# Patient Record
Sex: MALE | URBAN - METROPOLITAN AREA
[De-identification: names, ages, dates, MRNs, and addresses within clinical notes are randomized per-mention and may not be internally consistent; named-entity substitution may affect disease eponyms.]

---

## 2020-02-05 ENCOUNTER — APPOINTMENT (OUTPATIENT)
Dept: URBAN - METROPOLITAN AREA SURGERY 9 | Age: 70
Setting detail: DERMATOLOGY
End: 2020-02-07

## 2020-02-05 VITALS — HEART RATE: 76 BPM | SYSTOLIC BLOOD PRESSURE: 164 MMHG | RESPIRATION RATE: 14 BRPM | DIASTOLIC BLOOD PRESSURE: 100 MMHG

## 2020-02-05 PROBLEM — C44.311 BASAL CELL CARCINOMA OF SKIN OF NOSE: Status: ACTIVE | Noted: 2020-02-05

## 2020-02-05 PROCEDURE — OTHER CONSULTATION FOR MOHS SURGERY: OTHER

## 2020-02-05 PROCEDURE — OTHER MOHS SURGERY: OTHER

## 2020-02-05 PROCEDURE — OTHER RETURN TO REFERRING PROVIDER: OTHER

## 2020-02-05 PROCEDURE — 17311 MOHS 1 STAGE H/N/HF/G: CPT

## 2020-02-05 PROCEDURE — 17312 MOHS ADDL STAGE: CPT

## 2020-02-05 NOTE — HPI: MOHS SURGERY CONSULTATION
Has The Cancer Been Biopsied Before?: has been previously biopsied
Who Is Your Referring Provider?: Dr. Meng
When Was Your Biopsy?: 01/10/2020

## 2020-02-13 NOTE — PROCEDURE: MOHS SURGERY
96 Ventricular Rate BPM  96 Atrial Rate BPM  148 P-R Interval ms  78 QRS Duration ms  352 Q-T Interval ms  444 QTC Calculation(Bazett) ms  45 P Axis degrees  -12 R Axis degrees  51 T Axis degrees  Normal sinus rhythm  Moderate voltage criteria for LVH, may be normal variant  Borderline ECG  Confirmed by Robert PIERRE, Sunil ([id number]) on 9/5/2019 6:06:25 PM   IV intact Transposition Flap Text: The defect edges were debeveled with a #15 scalpel blade.  Given the location of the defect and the proximity to free margins a transposition flap was deemed most appropriate.  Using a sterile surgical marker, an appropriate transposition flap was drawn incorporating the defect.    The area thus outlined was incised deep to adipose tissue with a #15 scalpel blade.  The skin margins were undermined to an appropriate distance in all directions utilizing iris scissors.

## 2022-03-09 NOTE — PROCEDURE: MOHS SURGERY
Spoke with pt's wife. Advised pt can use an ACE wrap for now.  Pt will see ortho tomorrow--they may have other recommedations Mauc Instructions: By selecting yes to the question below the MAUC number will be added into the note.  This will be calculated automatically based on the diagnosis chosen, the size entered, the body zone selected (H,M,L) and the specific indications you chose. You will also have the option to override the Mohs AUC if you disagree with the automatically calculated number and this option is found in the Case Summary tab.

## 2023-02-03 ENCOUNTER — APPOINTMENT (OUTPATIENT)
Dept: URBAN - METROPOLITAN AREA SURGERY 9 | Age: 73
Setting detail: DERMATOLOGY
End: 2023-02-06

## 2023-02-03 PROBLEM — C44.319 BASAL CELL CARCINOMA OF SKIN OF OTHER PARTS OF FACE: Status: ACTIVE | Noted: 2023-02-03

## 2023-02-03 PROCEDURE — OTHER MOHS SURGERY: OTHER

## 2023-02-03 PROCEDURE — 17311 MOHS 1 STAGE H/N/HF/G: CPT

## 2023-02-03 PROCEDURE — 17312 MOHS ADDL STAGE: CPT

## 2023-02-03 PROCEDURE — OTHER CONSULTATION FOR MOHS SURGERY: OTHER

## 2023-02-03 PROCEDURE — OTHER RETURN TO REFERRING PROVIDER: OTHER

## 2023-02-03 PROCEDURE — OTHER MIPS QUALITY: OTHER

## 2023-02-03 PROCEDURE — 12053 INTMD RPR FACE/MM 5.1-7.5 CM: CPT

## 2023-02-03 NOTE — PROCEDURE: CONSULTATION FOR MOHS SURGERY
Detail Level: Detailed
Body Location Override (Optional - Billing Will Still Be Based On Selected Body Map Location If Applicable): right angle of jaw
X Size Of Lesion In Cm (Optional): 0
Name Of The Referring Provider For Procedure: Dr. Meng
Incorporate Mauc In Note: Yes

## 2023-02-03 NOTE — PROCEDURE: MOHS SURGERY
Kidney Stone (with Pain)    The sharp cramping pain on either side of your lower back and nausea/vomiting that you have are because of a small stone that has formed in the kidney. It is now passing down a narrow tube (ureter) on its way to your bladder. Once the stone reaches your bladder, the pain will often stop. But it may come back as the stone continues to pass out of the bladder and through the urethra. The stone may pass in your urine stream in one piece. The size may be 1/16 inch to 1/4 inch (1 to 6 mm). Or, the stone may break up into demetrio fragments that you may not even notice.  Once you have had a kidney stone, you are at risk of getting another one in the future. There are 4 types of kidney stones. Eighty percent are calcium stones--mostly calcium oxalate but also some with calcium phosphate. The other 3 types include uric acid stones, struvite stones (from a preceding infection), and rarely, cystine stones.  Most stones will pass on their own, but may take from a few hours to a few days. Sometimes the stone is too large to pass by itself. In that case, the health care provider will need to use other ways to remove the stone. These techniques include:  · Lithotripsy. This uses ultrasound waves to break up the stone.  · Ureteroscopy. This pushes a basket-like instrument through the urethra and bladder and into the ureter to pull out the stone.  · Various types of direct surgery through the skin  Home care  The following are general care guidelines:  · Drink plenty of fluids. This means at least 12, 8-ounce glasses of fluid--mostly water--a day.  · Each time you urinate, do so in a jar. Pour the urine from the jar through the strainer and into the toilet. Continue doing this until 24 hours after your pain stops. By then, if there was a kidney stone, it should pass from your bladder. Some stones dissolve into sand-like particles and pass right through the strainer. In that case, you won’t ever see a  stone.  · Save any stone that you find in the strainer and bring it to your doctor to look at. It may be possible to stop certain types of stones from forming. For this reason, it is important to know what kind of stone you have.  · Try to stay as active as possible. This will help the stone pass. Don't stay in bed unless your pain keeps you from getting up. You may notice a red, pink, or brown color to your urine. This is normal while passing a kidney stone.  · If you develop pain, you may take ibuprofen or naproxen for pain, unless another medicine was prescribed. If you have chronic liver or kidney disease, talk with your health care provider before taking these medicines. Also talk with your provider if you've had a stomach ulcer or GI bleeding.  Preventing stones  Each year for the next 5 to 7 years, you are at risk that a new stone will form. Your risk is a 50% chance over this time period. The risk is higher if you have a family history of kidney stones or have certain chronic illnesses like hypertension, obesity, or diabetes. But you can make changes to your lifestyle and diet that can lower your risk for another stone.  Most kidney stones are made of calcium. The following is advice for preventing another calcium stone. If you don’t know the type of stone you have, follow this advice until the cause of your stone is found.  Things that help:  · The most important thing you can do is to drink plenty of fluids each day. See home care above.   · Eat foods that contain phytates. These include wheat, rice, rye, barley, and beans. Phytates are substances that may lower your risk for any type of stone for form.  · Eat more fruits and vegetables. Choose those that are high in potassium.  · Eat foods high in natural citrate like fruit and low-sugar fruit juices.  · Having too little calcium in your diet can put you at risk for calcium kidney stones. Eat a normal amount of calcium in your diet and talk with your  health care provider if you are taking calcium supplements. Cutting back on your calcium intake may raise your risk. New research shows that eating calcium-rich and oxalate-rich foods together lowers your risk for stones by binding the minerals in the stomach and intestines before they can reach the kidneys.    · Limit salt intake to 2 grams (1 teaspoon) per day. Use limited amounts when cooking, and don’t add salt at the table. Processed and canned foods are usually high in salt.   · Spinach, rhubarb, peanuts, cashews, almonds, grapefruit, and grapefruit juice are all high oxalate foods. You should limit how much of these you eat. Or eat them with calcium-rich foods. These include dairy products, dark leafy greens, soy products, and calcium-enriched foods.  · Reducing the amount of animal meat and high protein foods in your diet may lower your risk of uric acid stones.  · Avoid excess sugar (sucrose) and fructose (sweetener in many soft drinks) in your diet.   · If you take vitamin C as a supplement, don't take more than 1,000 mg a day.  · A dietitian or your health provider can give you information about changes in your diet that will help stop more kidney stone from forming.  Follow-up care  Follow up with your health care provider, or as advised, if the pain lasts more than 48 hours. Talk with your provider about urine and blood tests to find out the cause of your stone. If you had an X-ray, CT scan, or other diagnostic test, it will be looked at by a specialist. You will be told of any new findings that may affect your care.  When to seek medical advice  Call your health care provider right away if any of these occur:  · Pain that is not controlled by the medicine given  · Repeated vomiting or unable to keep down fluids  · Weakness, dizziness, or fainting  · Fever of 100.4ºF (38ºC) or higher, or as directed by your health care provider  · Passage of solid red or brown urine (can't see through it) or urine with  lots of blood clots  · Foul-smelling or cloudy urine  · Unable to pass urine for 8 hours and increasing bladder pressure  © 2000-2015 [employer]. [address] [place] [address], [place] [address]. All rights reserved. This information is not intended as a substitute for professional medical care. Always follow your healthcare professional's instructions.        MEDICATION: NAPROXEN  Naproxen (brand names: Naprosyn, Anaprox, Aleve, Naprelan, Pamprin, Midol) is an anti-inflammatory drug which is very useful for pain and inflammatory conditions.  DIRECTIONS FOR USE:  You may take this medicine with food to reduce stomach upset.  WHAT TO WATCH FOR:  POSSIBLE SIDE EFFECTS: Nausea, upper abdominal pain, drowsiness, dizziness, ringing in the ears (Contact your doctor if these symptoms persist or become severe). Bleeding from the stomach, which may appear as blood in vomit or stool (red or black color); rapid weight gain, leg swelling or easy bruising, change in the amount of urine, confusion/mood changes, very stiff neck, yellowing of the eyes/skin (Contact your doctor or return to this facility promptly).  ALLERGIC REACTION: Rash, itching, swelling, trouble breathing or swallowing (Contact your doctor or return to this facility promptly).  MEDICAL CONDITIONS: Before starting this medicine, be sure your doctor knows if you have any of the following conditions:  · Stomach ulcer (active or in the past), history of vomiting blood or bloody stools  · Allergic reaction to aspirin or other anti-inflammatory medicines  · Asthma, nasal polyps or angioedema; pregnancy or breastfeeding  · Liver or kidney disease; bleeding disorder, diabetes  DRUG INTERACTION: Before starting this medicine, be sure your doctor knows if you are taking any of the following drugs:  · Blood thinners [Coumadin (warfarin), low molecular weight heparins, heparin], cyclosporine, diuretics (water pills), blood pressure pills, ACE inhibitors (Lotensin,  Capoten, Vasotec, Zestril, and others), diabetes pills, steroids (methylprednisolone, prednisone), aspirin or other anti-inflammatory drugs, methotrexate, probenecid, cidofovir, antiplatelet drugs such as Pletal (cilostazol) or Plavix (clopidogrel), desmopressin, drugs for osteoporosis [Fosamax(alendronate)], lithium, pemetrexed, bile acid binding resins, drotrecogin, SSRI antidepressants (fluoxetine, sertaline)  WARNINGS:  · Do not take with prednisone, other anti-inflammatory drugs, or alcohol since this increases the risk of getting a bleeding ulcer.  · Do not drive, ride a bicycle, or operate dangerous equipment while taking this medicine until you know how it will affect you.  [NOTE: This information topic may not include all directions, precautions, medical conditions, drug/food interactions, and warnings for this drug. Check with your doctor, nurse, or pharmacist for any questions that you may have.]  © 2000-2012 [facility], [address] [place] [address]. All rights reserved. This information is not intended as a substitute for professional medical care. Always follow your healthcare professional's instructions.  MEDICATION:  OXYCODONE AND ACETAMINOPHEN  Oxycodone and acetaminophen (brand names: Tylox, Endocet, Roxicet, Endocet, Percocet, and others) is a narcotic pain medicine. Be sure to take it only as directed.  DIRECTIONS FOR USE:  If this medicine upsets your stomach, take it with food. Pain medicine should be taken only if needed at the times prescribed. If you are not having pain, do not take the medicine unless you are advised to do so by your doctor. Limit your use of acetaminophen-containing products while taking this medicine. Too much acetaminophen may cause liver damage.  WHAT TO WATCH FOR  POSSIBLE SIDE EFFECTS: Dizziness, drowsiness (Rise slowly from a seated or lying position). Constipation (Drink lots of liquids, eat a high-fiber diet, use small doses of a mild laxative like  milk of magnesia as needed). Nausea, vomiting (Take the medicine with food, eat small meals, lie down. If persistent or severe, contact your doctor). Yellowing of eyes/skin, dark urine, unusual stomach pain (Contact your doctor). Difficulty passing urine, fainting, trouble breathing, difficulty waking up, chest pain, fast/irregular heartbeats, seizures (Stop the medicine and contact your doctor or return to this facility promptly).  ALLERGIC REACTION: Rash, itching, swelling, trouble breathing or swallowing (Contact your doctor or return to this facility promptly).  MEDICAL CONDITIONS: Before starting this medicine, be sure your doctor knows if you have any of the following conditions:  · Prostate enlargement, liver disease  · Breathing problems, kidney disease  · Stomach/intestinal problems  · History of head injury or seizures  · Alcohol or drug dependency  · Pregnancy or breastfeeding  DRUG INTERACTIONS: Before starting this medicine, be sure your doctor knows if you are taking any of the following drugs: narcotic blockers (naltrexone), alvimopan, conivaptan, dasatinib/imatinib, macrolide antibiotics, isonazid, itraconazole/ketoconazole, SSRI antidepressants (fluoxetine, sertaline, and others), protease inhibitors, serotonin agonists, sibutramine, other products containing acetaminophen, bile acid binding resins, voriconazole  · Barbiturates, Tegretol (carbamazepine), Dilantin (phenytoin), rifampin  · Coumadin (warfarin)  · Other pain medicines  WARNINGS:  · Do not drive, ride a bicycle, or operate dangerous equipment while taking this medicine until you know how it will affect you.  · This drug may cause increased side effects when taken with alcohol, muscle relaxants, sedatives, tricyclic antidepressants, MAO-inhibitors, or another pain medicine. Alcohol use may also increase the risk of liver damage when taken with medicines that contain acetaminophen.  · Prolonged use of this medicine can be habit forming and  may lead to addiction.  [NOTE: This information topic may not include all directions, precautions, medical conditions, drug/food interactions, and warnings for this drug. Check with your doctor, nurse, or pharmacist for any questions that you may have.]  © 6442-1499 [employer], [address] [place] [address], [place] [address]. All rights reserved. This information is not intended as a substitute for professional medical care. Always follow your healthcare professional's instructions.   Trilobed Flap Text: The defect edges were debeveled with a #15 scalpel blade.  Given the location of the defect and the proximity to free margins a trilobed flap was deemed most appropriate.  Using a sterile surgical marker, an appropriate trilobed flap drawn around the defect.    The area thus outlined was incised deep to adipose tissue with a #15 scalpel blade.  The skin margins were undermined to an appropriate distance in all directions utilizing iris scissors.

## 2023-02-03 NOTE — PROCEDURE: MOHS SURGERY
Scc Well Differentiated Histology Text: Arising from the\\nepidermis is a keratin-producing proliferation of atypical keratinocytes with invasion\\ninto the underlying dermis. Mitoses and atypical forms are evident. Intercellular bridge\\nand keratin sveta formation are evident.